# Patient Record
Sex: FEMALE | Race: WHITE | HISPANIC OR LATINO | Employment: FULL TIME | URBAN - METROPOLITAN AREA
[De-identification: names, ages, dates, MRNs, and addresses within clinical notes are randomized per-mention and may not be internally consistent; named-entity substitution may affect disease eponyms.]

---

## 2019-12-20 ENCOUNTER — TELEPHONE (OUTPATIENT)
Dept: URGENT CARE | Facility: CLINIC | Age: 49
End: 2019-12-20

## 2019-12-20 ENCOUNTER — OFFICE VISIT (OUTPATIENT)
Dept: URGENT CARE | Facility: CLINIC | Age: 49
End: 2019-12-20
Payer: COMMERCIAL

## 2019-12-20 VITALS
TEMPERATURE: 99 F | DIASTOLIC BLOOD PRESSURE: 69 MMHG | SYSTOLIC BLOOD PRESSURE: 105 MMHG | RESPIRATION RATE: 18 BRPM | OXYGEN SATURATION: 98 % | HEART RATE: 98 BPM | WEIGHT: 120 LBS | HEIGHT: 61 IN | BODY MASS INDEX: 22.66 KG/M2

## 2019-12-20 DIAGNOSIS — T78.3XXA: Primary | ICD-10-CM

## 2019-12-20 PROCEDURE — 96372 PR INJECTION,THERAP/PROPH/DIAG2ST, IM OR SUBCUT: ICD-10-PCS | Mod: S$GLB,,, | Performed by: NURSE PRACTITIONER

## 2019-12-20 PROCEDURE — 96372 THER/PROPH/DIAG INJ SC/IM: CPT | Mod: S$GLB,,, | Performed by: NURSE PRACTITIONER

## 2019-12-20 PROCEDURE — 99203 OFFICE O/P NEW LOW 30 MIN: CPT | Mod: 25,S$GLB,, | Performed by: NURSE PRACTITIONER

## 2019-12-20 PROCEDURE — 99203 PR OFFICE/OUTPT VISIT, NEW, LEVL III, 30-44 MIN: ICD-10-PCS | Mod: 25,S$GLB,, | Performed by: NURSE PRACTITIONER

## 2019-12-20 RX ORDER — SODIUM CHLORIDE 9 MG/ML
INJECTION, SOLUTION INTRAVENOUS
Status: COMPLETED | OUTPATIENT
Start: 2019-12-20 | End: 2019-12-20

## 2019-12-20 RX ORDER — EPINEPHRINE 0.3 MG/.3ML
1 INJECTION SUBCUTANEOUS
Qty: 2 EACH | Refills: 0 | Status: SHIPPED | OUTPATIENT
Start: 2019-12-20

## 2019-12-20 RX ORDER — PREDNISONE 20 MG/1
20 TABLET ORAL 2 TIMES DAILY
Qty: 10 TABLET | Refills: 0 | Status: SHIPPED | OUTPATIENT
Start: 2019-12-20 | End: 2019-12-25

## 2019-12-20 RX ORDER — DIPHENHYDRAMINE HYDROCHLORIDE 50 MG/ML
25 INJECTION INTRAMUSCULAR; INTRAVENOUS
Status: COMPLETED | OUTPATIENT
Start: 2019-12-20 | End: 2019-12-20

## 2019-12-20 RX ADMIN — DIPHENHYDRAMINE HYDROCHLORIDE 25 MG: 50 INJECTION INTRAMUSCULAR; INTRAVENOUS at 10:12

## 2019-12-20 RX ADMIN — SODIUM CHLORIDE 500 ML: 9 INJECTION, SOLUTION INTRAVENOUS at 11:12

## 2019-12-20 NOTE — PROGRESS NOTES
"Subjective:       Patient ID: Rebecca Edwards is a 49 y.o. female.    Vitals:  height is 5' 1" (1.549 m) and weight is 54.4 kg (120 lb). Her temperature is 98.5 °F (36.9 °C). Her blood pressure is 105/69 and her pulse is 98. Her respiration is 18 and oxygen saturation is 98%.     Chief Complaint: Rash    49-year-old female presents with hives and itchy throat.  Patient states that this started at  approximately 2 am.  She states that she that she ate some crawfish on yesterday.  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  Patient states that she believes that the seafood was boiled together.  States that she took Benadryl this morning with mild relief.  She denies sensation of throat closing, shortness of breath, chest pain, palpitations.  There is no swelling to the oral airway.  Uvula is midline.  She is able to speak and answer questions appropriately without difficulty.  There is no drooling or difficulty swallowing.    Rash   This is a new problem. The current episode started today. The problem has been gradually worsening since onset. The rash is diffuse. The rash is characterized by redness, swelling and itchiness. She was exposed to shellfish. Pertinent negatives include no cough, fever, shortness of breath or sore throat. Past treatments include nothing.       Constitution: Negative for chills and fever.   HENT: Negative for drooling, facial swelling, sore throat and trouble swallowing.    Neck: Negative for painful lymph nodes.   Eyes: Negative for eye itching and eyelid swelling.   Respiratory: Negative for cough and shortness of breath.    Musculoskeletal: Negative for joint pain and joint swelling.   Skin: Positive for rash. Negative for color change, pale, wound, abrasion, laceration, lesion, skin thickening/induration, puncture wound, bruising, abscess, avulsion and hives.   Allergic/Immunologic: Positive for food allergies. Negative for environmental allergies, " immunocompromised state and hives.   Hematologic/Lymphatic: Negative for swollen lymph nodes.       Objective:      Physical Exam   Constitutional: She is oriented to person, place, and time. Vital signs are normal. She appears well-developed and well-nourished. She is cooperative.  Non-toxic appearance. She does not have a sickly appearance. She does not appear ill. No distress.   HENT:   Head: Normocephalic.   Right Ear: Hearing, tympanic membrane, external ear and ear canal normal.   Left Ear: Hearing, tympanic membrane, external ear and ear canal normal.   Nose: Nose normal.   Mouth/Throat: Uvula is midline, oropharynx is clear and moist and mucous membranes are normal. Mucous membranes are not pale, not dry and not cyanotic. No uvula swelling. No oropharyngeal exudate, posterior oropharyngeal edema, posterior oropharyngeal erythema, tonsillar abscesses or cobblestoning. No tonsillar exudate.   No obvious swelling of tongue or oral airway   Eyes: Conjunctivae are normal.   Sclera clear bilat   Neck: Normal range of motion.   Cardiovascular: Normal rate, regular rhythm and normal heart sounds.   Pulmonary/Chest: Effort normal and breath sounds normal. No accessory muscle usage or stridor. No apnea, no tachypnea and no bradypnea. No respiratory distress. She has no decreased breath sounds. She has no wheezes. She has no rhonchi. She has no rales.   Abdominal: Soft. Normal appearance.   Neurological: She is alert and oriented to person, place, and time.   Skin: Skin is warm, dry, rash and urticarial. Capillary refill takes less than 2 seconds.   Nursing note and vitals reviewed.        Assessment:       1. Allergic angioedema due to seafood, initial encounter        Plan:       Patient is greatly improved after receiving 50 mg of IV Benadryl 125 mg of Solu-Medrol and 1 L of IV fluids.    Hives have decreased patient states that she is feeling better.  She denies any numbness or tingling of the tongue.  I will put a  referral in for an allergist.  Patient does not have PCP.  Also will give patient a script for EpiPen and given instructions on how to use.  Strict ER precautions given.  Discussed with the patient how to use EpiPen and to go to ER to use.  Patient states she understands patient's  is educated also on using EpiPen.  Allergic angioedema due to seafood, initial encounter  -     diphenhydrAMINE injection 25 mg  -     methylPREDNISolone sod suc(PF) injection 125 mg  -     diphenhydrAMINE injection 25 mg  -     0.9%  NaCl infusion  -     IV catheter kit accessory 18 gauge x 4 Fr ISet; 1 application by Misc.(Non-Drug; Combo Route) route once. for 1 dose  -     predniSONE (DELTASONE) 20 MG tablet; Take 1 tablet (20 mg total) by mouth 2 (two) times daily. for 5 days  Dispense: 10 tablet; Refill: 0  -     Ambulatory referral to Allergy  -     EPINEPHrine (EPIPEN) 0.3 mg/0.3 mL AtIn; Inject 0.3 mLs (0.3 mg total) into the muscle as needed (anaphylaxis).  Dispense: 2 each; Refill: 0      Patient Instructions     Please drink plenty of fluids.  Please get plenty of rest.  Please return here or go to the Emergency Department for any concerns or worsening of condition.  Please take over the counter Pepcid or Zantac as directed for the next 24-72hours as needed.  If you were given a steroid shot in the clinic and have also been given a prescription for a steroid such as Prednisone or a Medrol Dose Pack, please begin taking them tomorrow.  If you have a localized reaction it is ok to apply Cortaid as directed to the affected area.    Please take over the counter Benadryl as directed for the next 24-72 hours as needed for itching unless it makes you sleepy, then you can take over the counter Allegra or Claritin or Zyrtec as directed during the daytime hours as these generally do not cause drowsiness.    Please follow up with your primary care doctor or specialist as needed.    In the future make sure to keep benadryl with you  or an Epi-pen if you were prescribed one.  Discharge Instructions: Using an EpiPen Auto-Injector  Your healthcare provider has prescribed the EpiPen Auto-Injector for you. The EpiPen is used to give yourself a shot during an emergency allergic reaction. The pen is disposable and has a hidden needle. The needle is activated by a spring inside the pen. EpiPen makes giving yourself a shot easy. It also makes it easy for someone else to give you a shot if you are unable to do it for yourself.     Activate the EpiPen by removing the safety cap.       Jab the injector against the outside of your thigh.      Be prepared  Be prepared to use your EpiPen before you have an allergic reaction:  · Keep more than one EpiPen. Carry one kit with you and keep others in easy-to-find places, at home and at work.  · Make sure you check the expiration dates of your EpiPens.  · Dispose of the EpiPen properly after each use. The instructions that come with the EpiPen tell you how to do so.  Prepare your family and friends  Wear a medical ID bracelet. It should let others know about your allergy and what to do in an emergency. Tell your family, friends, and coworkers what they should do if you have a severe allergic reaction:  · Tell them to call 911 if it seems you are having a reaction.  · Tell them to start CPR if you stop breathing.  · Ask them to make sure you are lying down with your legs raised during the reaction.  · Show them how to use the EpiPen.  · If they need to give you a shot, tell them to always use the side of your thigh.  What to do if you have an allergic reaction  If you have an allergic reaction, give yourself a shot using the EpiPen. This will counteract the reaction until medical help arrives.  · Use any site on the side of your thigh. There is no need to look for the best injection site or to give the shot in the buttocks or arm.  · With the tip of the EpiPen pointed toward the side of your thigh, jab the pen  against your thigh for 10 seconds. This releases a spring-activated plunger, which pushes the hidden needle into the thigh muscle and gives a dose of adrenaline (epinephrine).  · Call 911 right after giving the injection.  · Lie down and elevate your legs while you wait for help to arrive.  Preventing allergic reactions  · Be careful. Try to avoid the items that cause your allergic reaction.  · Tell all your healthcare providers, including your pharmacist, about any allergies you have to medications. Keep a list of alternative medicines handy.  · Ask your healthcare provider if allergy shots (immunotherapy) will help you.  Follow-up care  Follow up with your healthcare provider, or as advised.  When to seek medical care   Call 911 right away if you have any of the following signs of severe allergic reaction:  · Racing pulse  · Wheezing or trouble breathing  · Swollen lips, tongue, or throat  · Drowsiness, fainting, or loss of consciousness  · Upset stomach (nausea) and vomiting  · Itchy, blotchy skin, rash, or hives  · Pale, cool, damp skin  · Confusion   Date Last Reviewed: 7/1/2016  © 8626-3482 [employer]. [address] [place] [address]. All rights reserved. This information is not intended as a substitute for professional medical care. Always follow your healthcare professional's instructions.        Anaphylaxis  Anaphylaxis is a severe allergic reaction that can be life threatening. This reaction can happen in a few minutes, or a few hours after exposure to what you are allergic to. Some people are more prone to this than others.  The symptoms of an anaphylactic reaction may at first seem similar to other allergic reactions. If this has happened to you in the past, do not let the initial mild symptoms, such as a rash, hives and itching, mislead you. Your reaction can worsen very quickly and become much more severe and life threatening within minutes.  More severe symptoms  include:  · Trouble swallowing, feeling like your throat is closing  · Trouble breathing, wheezing  · Cool, moist or pale (blue in color) skin  · Hoarse voice or trouble speaking  · Nausea, vomiting, diarrhea, stomach cramps, or pain  · Feeling faint or lightheaded, rapid heart rate, low blood pressure  · Feeling dizzy or confused  · Becoming very drowsy, poorly responsive, or trouble awakening  · Seizure  Sometimes the cause may be obvious, like knowing you are allergic to peanuts. To help identify your allergen, remember:  · When it started  · What you were doing at the time or just before that  · Any activities you were involved in  · Any new products or contacts   Here are some common causes, but remember almost anything can cause a reaction, and you may not even be aware that you came into contact with one of these things.  · Dust, mold, pollen  · Plants, such as poison ivy and poison oak  · Animals  · Foods such as shrimp, shellfish, peanuts, milk products, gluten, eggs; also colorings, flavorings, additives  · Insect bites or stings such as bees, mosquitos, fleas, ticks  · Medicines such as penicillin, sulfa drugs, amoxicillin, aspirin, ibuprofen; any medicine can cause a reaction  · Jewelry such as nickel, or gold (new, or something youve worn for a while including zippers, and buttons)  · Latex such as in gloves, clothes, toys, balloons, or some tapes (some people allergic to latex may also  have problems with foods like bananas, avocados, kiwi, papaya, or chestnuts)  · Lotions, perfumes, cosmetics, soaps, shampoos, skincare products, nail products  · Chemicals or dyes in clothing, linen, , hair dyes, soaps, iodine  If you are exposed to the same substance again, you may have the same or more severe reaction. Treatment for anaphylaxis is epinephrine (adrenalin). This is available by prescription as a self-injectable pen. If the cause of your reaction is known, you should avoid exposure in the  future. If the cause is not known, follow up with your doctor for special testing to determine what you are allergic to.     Home care  If it was safe for you to go home, watch for any worsening of symptoms. You may need to be treated again.  Medicines  Injectable epinephrine  One of the key tools in treating anaphylaxis is early use of epinephrine. If you had a severe allergic or anaphylactic reaction, the doctor may prescribe a self- injectable epinephrine kit. If this was prescribed, carry it at all times. It can be life saving. Epinephrine can help stop the progression of an allergic reaction. Its effects are brief, so after you use the medicine, it is still very important to call 911 and get to an emergency room.  When to use injectable epinephrine. Use the epinephrine if you have a history of severe reactions or any of the following symptoms:  · Swelling in your mouth or throat   · Trouble speaking or swallowing  · Trouble breathing  · Feeling faint, low blood pressure, or becoming drowsy or poorly responsive  · Worsening rash  How to use injectable epinephrine:  · Hold the syringe firmly in your hand with the orange (or black) needle end away from your thumb  · Be careful not to stick your fingers or hand with the needle.  · At the opposite end, pull off the activation cap- the blue or grey tab  · Holding the syringe tightly, jab it into the outer part of your upper thigh. This is one of the softest, fleshiest parts of the upper leg, and is not near a major blood vessel or nerve. Be careful not to inject it into your hip or any place that there is a pulse.  · You can inject it through pants, but make sure not to inject it into the seam of the pants.  · Do not pull it out right away. Try to hold the needle in place for 10 seconds.  · Massage the spot for a few seconds or as directed by your healthcare provider.  · If you are injecting it in someone else or a child, try to hold them or their leg still. If they  jerk or yank their legs away as you are doing it, it can cause a cut on their leg.  You may feel shaky, jittery, nervous, and anxious after the injection. Although it is difficult, try to relax. This is a side effect of the epinephrine, and should stop after a few minutes   Important  · Get to the emergency room after using the epinephrine. Its affect will wear off, and you may have a second reaction. This could even happen hours later.  · Never intentionally eat, use, or expose yourself to the substance that caused the anaphylactic reaction.  Nothing is foolproof, including the injectable epinephrine.  Other medicines  The doctor may prescribe medicine to relieve swelling, itching, and pain. Follow the doctors instructions when using this medicine.   · Oral diphenhydramine is an antihistamine available at drug and grocery stores. Unless a prescription antihistamine was given, diphenhydramine may be used to reduce itching if large areas of the skin are involved. It may make you sleepy, so be careful using it in the daytime or when going to school, working, or driving.   · Do not use diphenhydramine cream on your skin, because in some people it can cause a further reaction.  · You may use over-the-counter acetaminophen or ibuprofen to control pain, unless another pain medicine was prescribed.  · If you were prescribed any medicines to prevent symptoms from returning, be sure to take them exactly as directed.  General care  · Rest at home for the next 24 hours.  · Avoid tobacco and alcohol consumption. These may worsen your symptoms.  · If you know what caused your reaction today, avoid that in the future since the next reaction may be worse. Let your family members, friends and personal physician know about your allergic reaction.  · If your allergy was to food, learn how to read food labels so you can check for that ingredient. If a product does not have a label, it is best to avoid it.  · Consider carrying an  identification card or getting a medical alert bracelet to inform medical personnel of your condition in case you cannot tell them.  · Tell all of your healthcare providers that you had an anaphylactic reaction. Make certain the information is added to your electronic or paper medical records.  Follow-up care  Follow up with your doctor or as advised if you are not improving over the next 1 to 2 days.  Call 911  Call 911 if any of these occur:  · Trouble breathing or swallowing, wheezing  · Hoarse voice or trouble speaking  · Chest pain  · Confused  · Very drowsy or trouble awakening  · Fainting or loss of consciousness  · Rapid heart rate  · Vomiting blood, or large amounts of blood in stool  · Seizure  · Swelling in the eyes, mouth, face, or tongue  · Dizziness or weakness  · Cool, moist, or pale (blue in color) skin  · Nausea, vomiting, diarrhea, stomach cramps, or abdominal pain  When to seek medical advice  Call your healthcare provider right away if any of these occur:  · Worsening of your symptoms  · New symptoms develop  · Symptoms don't go away or come back  Date Last Reviewed: 4/1/2017  © 1554-0182 [employer]. [address] [place] [address]. All rights reserved. This information is not intended as a substitute for professional medical care. Always follow your healthcare professional's instructions.        Food Allergy  The best way to deal with food allergies is to avoid the foods you are allergic to. Understand and be aware of the foods that you have reacted to. Also be cautious of foods or dishes that may have flavorings or small amounts of foods that you are allergic to.  Symptoms of food allergy may begin within minutes, but can start 2 hours after eating or later. Common symptoms can include:  · Nausea  · Vomiting  · Diarrhea or stomach cramps  · Iitchy rash (hives)  · Swelling of the eyes, lips, face or tongue  · Wheezing  · Difficulty breathing or swallowing  · Throat  "tightness  · Dizziness or fainting  This kind of allergic reaction, called anaphylaxis, can be life-threatening. In mild and moderate cases the symptoms usually begin improving within 6 to 24 hours. People with certain health problems, such as asthma and eczema, may be more likely to have food allergies. Foods that people are most commonly allergic to are milk or dairy products, eggs, peanuts, tree nuts, soy, shellfish, and wheat. Remember that any food can cause a reaction. Treatment for a severe allergic reaction can include epinephrine. If you have a severe food allergy, or have had severe allergic reactions even if you don't know the cause, you should carry this medicine with you for self-injection. It is available by prescription. It is also available in a lower dose form for children from your healthcare provider.  Home care  The following guidelines will help you care for yourself at home:  · If your symptoms were moderate to severe, they may fluctuate for the next 24 hours. It may be best to rest at home during that time.  · Avoid tobacco and alcohol because they can make symptoms worse. They can also interact with the medicines you are taking to treat the allergic reaction.  · If you know what foods caused your reaction today, avoid them in the future. The next and each reaction after this may make your body more sensitive to these foods. This can cause a worse reaction later. Tell your family members, friends, and doctors about your food allergy, especially in an emergency situation since they need to know how to give you epinephrine if you are unable to. This can be life-saving.  · Learn how to read food labels so you can check for the substance that you reacted to. If a food does not have a label, it is best to avoid it. When in restaurants, ask about ingredients and tell the staff, "If I eat a dish containing (food you are allergic to), I could have a severe allergic reaction."  · If your reaction was " severe, get a medical alert bracelet or necklace that notes your allergy.  · If epinephrine is prescribed, carry it with you at all times. Learn how to use the device. If you begin to feel the symptoms of another reaction, use the epinephrine to inject yourself right away, and call 911. Dont wait until symptoms become severe.  · Oral allergy medicines (diphenhydramine) are antihistamines that can help with the reaction. You can buy them at any pharmacy or supermarket. They come in liquids, pills, or capsules. Unless your doctor gave you a prescription antihistamine, you can use these medicines to ease itching. Allergy medicines can make you sleepy, so be careful, especially when driving or working. For this reason, you may want to use lower doses during the day and save the higher doses for bedtime. Don't use diphenhydramine if you have glaucoma or if you are a man with trouble urinating because of an enlarged prostate.  · If allergy medicines with diphenhydramine make you too sleepy, talk with your healthcare provider. He or she can recommend an over-the counter antihistamine that won't make you sleepy. These may not work as well, though.  Follow-up care  Follow up with your healthcare provider if your symptoms don't get better over the next 2 to 3 days. If you don't know what caused this reaction, your provider may order skin tests and blood tests, or an elimination diet. You can find an allergy specialist in your area by contacting:  · American Academy of Allergy, Asthma & Immunology, www.aaaai.org  · American College of Allergy, Asthma & Immunology, www.acaai.org  When to seek medical advice  [facility] right away if any of these occur:  · Your symptoms get worse  · New or worse swelling in the face, eyelids, lips, mouth, throat, or tongue  · Mild trouble swallowing, breathing, or wheezing  · Fever of 100.4°F (38.0°C) or higher, or as directed by your health care provider  · Severe  abdominal pain  · Persistent vomiting (unable to keep liquids down) or constant diarrhea  · Blood or mucus in the stool  Call 911  If any of these occur, give yourself injectable epinephrine and call 911:  · Significant trouble breathing, talking, or swallowing  · Any change in level of alertness or unconsciousness, including dizziness, weakness, or fainting  · Cool, moist skin  · Fast, weak hearbeat  · Severe wheezing  · Hives  · Severe swelling of the face, tongue, or lips  · Drooling  · Vomiting that happens soon after eating a food you think you are allergic to  · Explosive diarrhea  Date Last Reviewed: 1/11/2016 © 2000-2017 [employer]. [address] [place] [address], [place] [address]. All rights reserved. This information is not intended as a substitute for professional medical care. Always follow your healthcare professional's instructions.        Understanding Hives (Urticaria)  Urticaria (hives) are red, itchy, and swollen areas on the skin. They are most often an allergic reaction from eating a food or taking a medicine. Sometimes the cause may be unknown. A single hive can vary in size from a half inch to several inches. Hives can appear all over the body. Or they may appear on only one part of the body.  What causes hives?  Hives can be caused by food and beverages such as:  · Tree nuts (almonds, walnuts, hazelnuts)  · Peanuts  · Eggs  · Shellfish  · Milk  Hives can also be caused by medicines such as:  · Antibiotics, especially penicillin and sulfa-based medicines   · Anticonvulsant or antiseizure medicines   · Chemotherapy medicines   Other causes of hives include:  · Infection or virus  · Heat  · Cold air or cold water  · Exercise  · Scratching or rubbing your skin, or wearing tight-fitting clothes that rub your skin  · Being exposed to sunlight or light from a light bulb, in rare cases  · Inhaled-chemicals in the environment from foods and drugs, insects, plants, or other sources  In some  cases, hives may occur again and again with no specific cause.  If you have hives  · Avoid the food, drink, medicine, or other factor that may be causing the hives.  · Make a thick paste of baking soda and water. Apply the paste directly to your skin. This can help lessen itching.  · Talk with your healthcare provider right away if you think a medicine gave you hives.  Watch for anaphylaxis  If you have hives, watch for symptoms of a severe reaction that can affect your entire body. This is called anaphylaxis. Symptoms can include swollen areas of the body, wheezing, trouble breathing or swallowing, and a hoarse voice. This reaction may happen right away. Or it may happen in an hour or more. In extreme cases, the airways from mouth to lungs may swell and make breathing difficult. This is a medical emergency. Use epinephrine medicine if you have it, and call 911 or go to the emergency room.     When to call your healthcare provider  Call your healthcare provider if:  · Your hives feel uncomfortable  · You have never had hives before  · Your symptoms don't go away or come back  · Your symptoms get worse or new symptoms develop such as:   ¨ Sneezing, coughing, runny or stuffy nose  ¨ Itching of the eyes, nose, or roof of the mouth  ¨ Itching, burning, stinging, or pain  ¨ Dry, flaky, cracking, or scaly skin  ¨ Red or purple spots  When to call 911  Call 911 right away if you have:  · Swelling in your lips, tongue, or throat, called angioedema  · Drooling  · Trouble breathing, talking, or swallowing  · Cool, moist or pale (blue in color) skin  · Fast and weak heartbeat  · Wheezing or short of breath  · Feeling lightheaded or confused  · Diarrhea  · Severe nausea or vomiting  · Seizure  · Feeling dizzy or weak, or a sudden drop in blood pressure   Date Last Reviewed: 4/1/2017 © 2000-2017 [employer] [address] [place] [address], [place] [address]. All rights reserved. This information is not intended as a  substitute for professional medical care. Always follow your healthcare professional's instructions.             Patient Instructions     Please drink plenty of fluids.  Please get plenty of rest.  Please return here or go to the Emergency Department for any concerns or worsening of condition.  Please take over the counter Pepcid or Zantac as directed for the next 24-72hours as needed.  If you were given a steroid shot in the clinic and have also been given a prescription for a steroid such as Prednisone or a Medrol Dose Pack, please begin taking them tomorrow.  If you have a localized reaction it is ok to apply Cortaid as directed to the affected area.    Please take over the counter Benadryl as directed for the next 24-72 hours as needed for itching unless it makes you sleepy, then you can take over the counter Allegra or Claritin or Zyrtec as directed during the daytime hours as these generally do not cause drowsiness.    Please follow up with your primary care doctor or specialist as needed.    In the future make sure to keep benadryl with you or an Epi-pen if you were prescribed one.  Discharge Instructions: Using an EpiPen Auto-Injector  Your healthcare provider has prescribed the EpiPen Auto-Injector for you. The EpiPen is used to give yourself a shot during an emergency allergic reaction. The pen is disposable and has a hidden needle. The needle is activated by a spring inside the pen. EpiPen makes giving yourself a shot easy. It also makes it easy for someone else to give you a shot if you are unable to do it for yourself.     Activate the EpiPen by removing the safety cap.       Jab the injector against the outside of your thigh.      Be prepared  Be prepared to use your EpiPen before you have an allergic reaction:  · Keep more than one EpiPen. Carry one kit with you and keep others in easy-to-find places, at home and at work.  · Make sure you check the expiration dates of your EpiPens.  · Dispose of the  EpiPen properly after each use. The instructions that come with the EpiPen tell you how to do so.  Prepare your family and friends  Wear a medical ID bracelet. It should let others know about your allergy and what to do in an emergency. Tell your family, friends, and coworkers what they should do if you have a severe allergic reaction:  · Tell them to call 911 if it seems you are having a reaction.  · Tell them to start CPR if you stop breathing.  · Ask them to make sure you are lying down with your legs raised during the reaction.  · Show them how to use the EpiPen.  · If they need to give you a shot, tell them to always use the side of your thigh.  What to do if you have an allergic reaction  If you have an allergic reaction, give yourself a shot using the EpiPen. This will counteract the reaction until medical help arrives.  · Use any site on the side of your thigh. There is no need to look for the best injection site or to give the shot in the buttocks or arm.  · With the tip of the EpiPen pointed toward the side of your thigh, jab the pen against your thigh for 10 seconds. This releases a spring-activated plunger, which pushes the hidden needle into the thigh muscle and gives a dose of adrenaline (epinephrine).  · Call 911 right after giving the injection.  · Lie down and elevate your legs while you wait for help to arrive.  Preventing allergic reactions  · Be careful. Try to avoid the items that cause your allergic reaction.  · Tell all your healthcare providers, including your pharmacist, about any allergies you have to medications. Keep a list of alternative medicines handy.  · Ask your healthcare provider if allergy shots (immunotherapy) will help you.  Follow-up care  Follow up with your healthcare provider, or as advised.  When to seek medical care   Call 911 right away if you have any of the following signs of severe allergic reaction:  · Racing pulse  · Wheezing or trouble breathing  · Swollen lips,  tongue, or throat  · Drowsiness, fainting, or loss of consciousness  · Upset stomach (nausea) and vomiting  · Itchy, blotchy skin, rash, or hives  · Pale, cool, damp skin  · Confusion   Date Last Reviewed: 7/1/2016 © 2000-2017 [employer]. [address] [place] [id number]. All rights reserved. This information is not intended as a substitute for professional medical care. Always follow your healthcare professional's instructions.        Anaphylaxis  Anaphylaxis is a severe allergic reaction that can be life threatening. This reaction can happen in a few minutes, or a few hours after exposure to what you are allergic to. Some people are more prone to this than others.  The symptoms of an anaphylactic reaction may at first seem similar to other allergic reactions. If this has happened to you in the past, do not let the initial mild symptoms, such as a rash, hives and itching, mislead you. Your reaction can worsen very quickly and become much more severe and life threatening within minutes.  More severe symptoms include:  · Trouble swallowing, feeling like your throat is closing  · Trouble breathing, wheezing  · Cool, moist or pale (blue in color) skin  · Hoarse voice or trouble speaking  · Nausea, vomiting, diarrhea, stomach cramps, or pain  · Feeling faint or lightheaded, rapid heart rate, low blood pressure  · Feeling dizzy or confused  · Becoming very drowsy, poorly responsive, or trouble awakening  · Seizure  Sometimes the cause may be obvious, like knowing you are allergic to peanuts. To help identify your allergen, remember:  · When it started  · What you were doing at the time or just before that  · Any activities you were involved in  · Any new products or contacts   Here are some common causes, but remember almost anything can cause a reaction, and you may not even be aware that you came into contact with one of these things.  · Dust, mold, pollen  · Plants, such as poison ivy and  poison oak  · Animals  · Foods such as shrimp, shellfish, peanuts, milk products, gluten, eggs; also colorings, flavorings, additives  · Insect bites or stings such as bees, mosquitos, fleas, ticks  · Medicines such as penicillin, sulfa drugs, amoxicillin, aspirin, ibuprofen; any medicine can cause a reaction  · Jewelry such as nickel, or gold (new, or something youve worn for a while including zippers, and buttons)  · Latex such as in gloves, clothes, toys, balloons, or some tapes (some people allergic to latex may also  have problems with foods like bananas, avocados, kiwi, papaya, or chestnuts)  · Lotions, perfumes, cosmetics, soaps, shampoos, skincare products, nail products  · Chemicals or dyes in clothing, linen, , hair dyes, soaps, iodine  If you are exposed to the same substance again, you may have the same or more severe reaction. Treatment for anaphylaxis is epinephrine (adrenalin). This is available by prescription as a self-injectable pen. If the cause of your reaction is known, you should avoid exposure in the future. If the cause is not known, follow up with your doctor for special testing to determine what you are allergic to.     Home care  If it was safe for you to go home, watch for any worsening of symptoms. You may need to be treated again.  Medicines  Injectable epinephrine  One of the key tools in treating anaphylaxis is early use of epinephrine. If you had a severe allergic or anaphylactic reaction, the doctor may prescribe a self- injectable epinephrine kit. If this was prescribed, carry it at all times. It can be life saving. Epinephrine can help stop the progression of an allergic reaction. Its effects are brief, so after you use the medicine, it is still very important to call 911 and get to an emergency room.  When to use injectable epinephrine. Use the epinephrine if you have a history of severe reactions or any of the following symptoms:  · Swelling in your mouth or  throat   · Trouble speaking or swallowing  · Trouble breathing  · Feeling faint, low blood pressure, or becoming drowsy or poorly responsive  · Worsening rash  How to use injectable epinephrine:  · Hold the syringe firmly in your hand with the orange (or black) needle end away from your thumb  · Be careful not to stick your fingers or hand with the needle.  · At the opposite end, pull off the activation cap- the blue or grey tab  · Holding the syringe tightly, jab it into the outer part of your upper thigh. This is one of the softest, fleshiest parts of the upper leg, and is not near a major blood vessel or nerve. Be careful not to inject it into your hip or any place that there is a pulse.  · You can inject it through pants, but make sure not to inject it into the seam of the pants.  · Do not pull it out right away. Try to hold the needle in place for 10 seconds.  · Massage the spot for a few seconds or as directed by your healthcare provider.  · If you are injecting it in someone else or a child, try to hold them or their leg still. If they jerk or yank their legs away as you are doing it, it can cause a cut on their leg.  You may feel shaky, jittery, nervous, and anxious after the injection. Although it is difficult, try to relax. This is a side effect of the epinephrine, and should stop after a few minutes   Important  · Get to the emergency room after using the epinephrine. Its affect will wear off, and you may have a second reaction. This could even happen hours later.  · Never intentionally eat, use, or expose yourself to the substance that caused the anaphylactic reaction.  Nothing is foolproof, including the injectable epinephrine.  Other medicines  The doctor may prescribe medicine to relieve swelling, itching, and pain. Follow the doctors instructions when using this medicine.   · Oral diphenhydramine is an antihistamine available at drug and grocery stores. Unless a prescription antihistamine was given,  diphenhydramine may be used to reduce itching if large areas of the skin are involved. It may make you sleepy, so be careful using it in the daytime or when going to school, working, or driving.   · Do not use diphenhydramine cream on your skin, because in some people it can cause a further reaction.  · You may use over-the-counter acetaminophen or ibuprofen to control pain, unless another pain medicine was prescribed.  · If you were prescribed any medicines to prevent symptoms from returning, be sure to take them exactly as directed.  General care  · Rest at home for the next 24 hours.  · Avoid tobacco and alcohol consumption. These may worsen your symptoms.  · If you know what caused your reaction today, avoid that in the future since the next reaction may be worse. Let your family members, friends and personal physician know about your allergic reaction.  · If your allergy was to food, learn how to read food labels so you can check for that ingredient. If a product does not have a label, it is best to avoid it.  · Consider carrying an identification card or getting a medical alert bracelet to inform medical personnel of your condition in case you cannot tell them.  · Tell all of your healthcare providers that you had an anaphylactic reaction. Make certain the information is added to your electronic or paper medical records.  Follow-up care  Follow up with your doctor or as advised if you are not improving over the next 1 to 2 days.  Call 911  Call 911 if any of these occur:  · Trouble breathing or swallowing, wheezing  · Hoarse voice or trouble speaking  · Chest pain  · Confused  · Very drowsy or trouble awakening  · Fainting or loss of consciousness  · Rapid heart rate  · Vomiting blood, or large amounts of blood in stool  · Seizure  · Swelling in the eyes, mouth, face, or tongue  · Dizziness or weakness  · Cool, moist, or pale (blue in color) skin  · Nausea, vomiting, diarrhea, stomach cramps, or abdominal  pain  When to seek medical advice  Call your healthcare provider right away if any of these occur:  · Worsening of your symptoms  · New symptoms develop  · Symptoms don't go away or come back  Date Last Reviewed: 4/1/2017 © 2000-2017 [employer]. [address] [place] [address], [place] [address]. All rights reserved. This information is not intended as a substitute for professional medical care. Always follow your healthcare professional's instructions.        Food Allergy  The best way to deal with food allergies is to avoid the foods you are allergic to. Understand and be aware of the foods that you have reacted to. Also be cautious of foods or dishes that may have flavorings or small amounts of foods that you are allergic to.  Symptoms of food allergy may begin within minutes, but can start 2 hours after eating or later. Common symptoms can include:  · Nausea  · Vomiting  · Diarrhea or stomach cramps  · Iitchy rash (hives)  · Swelling of the eyes, lips, face or tongue  · Wheezing  · Difficulty breathing or swallowing  · Throat tightness  · Dizziness or fainting  This kind of allergic reaction, called anaphylaxis, can be life-threatening. In mild and moderate cases the symptoms usually begin improving within 6 to 24 hours. People with certain health problems, such as asthma and eczema, may be more likely to have food allergies. Foods that people are most commonly allergic to are milk or dairy products, eggs, peanuts, tree nuts, soy, shellfish, and wheat. Remember that any food can cause a reaction. Treatment for a severe allergic reaction can include epinephrine. If you have a severe food allergy, or have had severe allergic reactions even if you don't know the cause, you should carry this medicine with you for self-injection. It is available by prescription. It is also available in a lower dose form for children from your healthcare provider.  Home care  The following guidelines will help you care for  "yourself at home:  · If your symptoms were moderate to severe, they may fluctuate for the next 24 hours. It may be best to rest at home during that time.  · Avoid tobacco and alcohol because they can make symptoms worse. They can also interact with the medicines you are taking to treat the allergic reaction.  · If you know what foods caused your reaction today, avoid them in the future. The next and each reaction after this may make your body more sensitive to these foods. This can cause a worse reaction later. Tell your family members, friends, and doctors about your food allergy, especially in an emergency situation since they need to know how to give you epinephrine if you are unable to. This can be life-saving.  · Learn how to read food labels so you can check for the substance that you reacted to. If a food does not have a label, it is best to avoid it. When in restaurants, ask about ingredients and tell the staff, "If I eat a dish containing (food you are allergic to), I could have a severe allergic reaction."  · If your reaction was severe, get a medical alert bracelet or necklace that notes your allergy.  · If epinephrine is prescribed, carry it with you at all times. Learn how to use the device. If you begin to feel the symptoms of another reaction, use the epinephrine to inject yourself right away, and call 911. Dont wait until symptoms become severe.  · Oral allergy medicines (diphenhydramine) are antihistamines that can help with the reaction. You can buy them at any pharmacy or supermarket. They come in liquids, pills, or capsules. Unless your doctor gave you a prescription antihistamine, you can use these medicines to ease itching. Allergy medicines can make you sleepy, so be careful, especially when driving or working. For this reason, you may want to use lower doses during the day and save the higher doses for bedtime. Don't use diphenhydramine if you have glaucoma or if you are a man with trouble " urinating because of an enlarged prostate.  · If allergy medicines with diphenhydramine make you too sleepy, talk with your healthcare provider. He or she can recommend an over-the counter antihistamine that won't make you sleepy. These may not work as well, though.  Follow-up care  Follow up with your healthcare provider if your symptoms don't get better over the next 2 to 3 days. If you don't know what caused this reaction, your provider may order skin tests and blood tests, or an elimination diet. You can find an allergy specialist in your area by contacting:  · American Academy of Allergy, Asthma & Immunology, www.aaaai.org  · American College of Allergy, Asthma & Immunology, www.acaai.org  When to seek medical advice  [facility] right away if any of these occur:  · Your symptoms get worse  · New or worse swelling in the face, eyelids, lips, mouth, throat, or tongue  · Mild trouble swallowing, breathing, or wheezing  · Fever of 100.4°F (38.0°C) or higher, or as directed by your health care provider  · Severe abdominal pain  · Persistent vomiting (unable to keep liquids down) or constant diarrhea  · Blood or mucus in the stool  Call 911  If any of these occur, give yourself injectable epinephrine and call 911:  · Significant trouble breathing, talking, or swallowing  · Any change in level of alertness or unconsciousness, including dizziness, weakness, or fainting  · Cool, moist skin  · Fast, weak hearbeat  · Severe wheezing  · Hives  · Severe swelling of the face, tongue, or lips  · Drooling  · Vomiting that happens soon after eating a food you think you are allergic to  · Explosive diarrhea  Date Last Reviewed: 1/11/2016  © 0266-1839 [employer]. [address] [place] [address], [place] [address]. All rights reserved. This information is not intended as a substitute for professional medical care. Always follow your healthcare professional's instructions.        Understanding Hives  (Urticaria)  Urticaria (hives) are red, itchy, and swollen areas on the skin. They are most often an allergic reaction from eating a food or taking a medicine. Sometimes the cause may be unknown. A single hive can vary in size from a half inch to several inches. Hives can appear all over the body. Or they may appear on only one part of the body.  What causes hives?  Hives can be caused by food and beverages such as:  · Tree nuts (almonds, walnuts, hazelnuts)  · Peanuts  · Eggs  · Shellfish  · Milk  Hives can also be caused by medicines such as:  · Antibiotics, especially penicillin and sulfa-based medicines   · Anticonvulsant or antiseizure medicines   · Chemotherapy medicines   Other causes of hives include:  · Infection or virus  · Heat  · Cold air or cold water  · Exercise  · Scratching or rubbing your skin, or wearing tight-fitting clothes that rub your skin  · Being exposed to sunlight or light from a light bulb, in rare cases  · Inhaled-chemicals in the environment from foods and drugs, insects, plants, or other sources  In some cases, hives may occur again and again with no specific cause.  If you have hives  · Avoid the food, drink, medicine, or other factor that may be causing the hives.  · Make a thick paste of baking soda and water. Apply the paste directly to your skin. This can help lessen itching.  · Talk with your healthcare provider right away if you think a medicine gave you hives.  Watch for anaphylaxis  If you have hives, watch for symptoms of a severe reaction that can affect your entire body. This is called anaphylaxis. Symptoms can include swollen areas of the body, wheezing, trouble breathing or swallowing, and a hoarse voice. This reaction may happen right away. Or it may happen in an hour or more. In extreme cases, the airways from mouth to lungs may swell and make breathing difficult. This is a medical emergency. Use epinephrine medicine if you have it, and call 911 or go to the emergency  room.     When to call your healthcare provider  Call your healthcare provider if:  · Your hives feel uncomfortable  · You have never had hives before  · Your symptoms don't go away or come back  · Your symptoms get worse or new symptoms develop such as:   ¨ Sneezing, coughing, runny or stuffy nose  ¨ Itching of the eyes, nose, or roof of the mouth  ¨ Itching, burning, stinging, or pain  ¨ Dry, flaky, cracking, or scaly skin  ¨ Red or purple spots  When to call 911  Call 911 right away if you have:  · Swelling in your lips, tongue, or throat, called angioedema  · Drooling  · Trouble breathing, talking, or swallowing  · Cool, moist or pale (blue in color) skin  · Fast and weak heartbeat  · Wheezing or short of breath  · Feeling lightheaded or confused  · Diarrhea  · Severe nausea or vomiting  · Seizure  · Feeling dizzy or weak, or a sudden drop in blood pressure   Date Last Reviewed: 4/1/2017 © 2000-2017 [employer]. [address] [place] [address], [place] [address]. All rights reserved. This information is not intended as a substitute for professional medical care. Always follow your healthcare professional's instructions.

## 2019-12-20 NOTE — PATIENT INSTRUCTIONS
Please drink plenty of fluids.  Please get plenty of rest.  Please return here or go to the Emergency Department for any concerns or worsening of condition.  Please take over the counter Pepcid or Zantac as directed for the next 24-72hours as needed.  If you were given a steroid shot in the clinic and have also been given a prescription for a steroid such as Prednisone or a Medrol Dose Pack, please begin taking them tomorrow.  If you have a localized reaction it is ok to apply Cortaid as directed to the affected area.    Please take over the counter Benadryl as directed for the next 24-72 hours as needed for itching unless it makes you sleepy, then you can take over the counter Allegra or Claritin or Zyrtec as directed during the daytime hours as these generally do not cause drowsiness.    Please follow up with your primary care doctor or specialist as needed.    In the future make sure to keep benadryl with you or an Epi-pen if you were prescribed one.  Discharge Instructions: Using an EpiPen Auto-Injector  Your healthcare provider has prescribed the EpiPen Auto-Injector for you. The EpiPen is used to give yourself a shot during an emergency allergic reaction. The pen is disposable and has a hidden needle. The needle is activated by a spring inside the pen. EpiPen makes giving yourself a shot easy. It also makes it easy for someone else to give you a shot if you are unable to do it for yourself.     Activate the EpiPen by removing the safety cap.       Jab the injector against the outside of your thigh.      Be prepared  Be prepared to use your EpiPen before you have an allergic reaction:  · Keep more than one EpiPen. Carry one kit with you and keep others in easy-to-find places, at home and at work.  · Make sure you check the expiration dates of your EpiPens.  · Dispose of the EpiPen properly after each use. The instructions that come with the EpiPen tell you how to do so.  Prepare your family and friends  Wear a  medical ID brajasmin. It should let others know about your allergy and what to do in an emergency. Tell your family, friends, and coworkers what they should do if you have a severe allergic reaction:  · Tell them to call 911 if it seems you are having a reaction.  · Tell them to start CPR if you stop breathing.  · Ask them to make sure you are lying down with your legs raised during the reaction.  · Show them how to use the EpiPen.  · If they need to give you a shot, tell them to always use the side of your thigh.  What to do if you have an allergic reaction  If you have an allergic reaction, give yourself a shot using the EpiPen. This will counteract the reaction until medical help arrives.  · Use any site on the side of your thigh. There is no need to look for the best injection site or to give the shot in the buttocks or arm.  · With the tip of the EpiPen pointed toward the side of your thigh, jab the pen against your thigh for 10 seconds. This releases a spring-activated plunger, which pushes the hidden needle into the thigh muscle and gives a dose of adrenaline (epinephrine).  · Call 911 right after giving the injection.  · Lie down and elevate your legs while you wait for help to arrive.  Preventing allergic reactions  · Be careful. Try to avoid the items that cause your allergic reaction.  · Tell all your healthcare providers, including your pharmacist, about any allergies you have to medications. Keep a list of alternative medicines handy.  · Ask your healthcare provider if allergy shots (immunotherapy) will help you.  Follow-up care  Follow up with your healthcare provider, or as advised.  When to seek medical care   Call 911 right away if you have any of the following signs of severe allergic reaction:  · Racing pulse  · Wheezing or trouble breathing  · Swollen lips, tongue, or throat  · Drowsiness, fainting, or loss of consciousness  · Upset stomach (nausea) and vomiting  · Itchy, blotchy skin, rash, or  hives  · Pale, cool, damp skin  · Confusion   Date Last Reviewed: 7/1/2016  © 4115-6710 [employer]. [address] [place] [address], [place] [address]. All rights reserved. This information is not intended as a substitute for professional medical care. Always follow your healthcare professional's instructions.        Anaphylaxis  Anaphylaxis is a severe allergic reaction that can be life threatening. This reaction can happen in a few minutes, or a few hours after exposure to what you are allergic to. Some people are more prone to this than others.  The symptoms of an anaphylactic reaction may at first seem similar to other allergic reactions. If this has happened to you in the past, do not let the initial mild symptoms, such as a rash, hives and itching, mislead you. Your reaction can worsen very quickly and become much more severe and life threatening within minutes.  More severe symptoms include:  · Trouble swallowing, feeling like your throat is closing  · Trouble breathing, wheezing  · Cool, moist or pale (blue in color) skin  · Hoarse voice or trouble speaking  · Nausea, vomiting, diarrhea, stomach cramps, or pain  · Feeling faint or lightheaded, rapid heart rate, low blood pressure  · Feeling dizzy or confused  · Becoming very drowsy, poorly responsive, or trouble awakening  · Seizure  Sometimes the cause may be obvious, like knowing you are allergic to peanuts. To help identify your allergen, remember:  · When it started  · What you were doing at the time or just before that  · Any activities you were involved in  · Any new products or contacts   Here are some common causes, but remember almost anything can cause a reaction, and you may not even be aware that you came into contact with one of these things.  · Dust, mold, pollen  · Plants, such as poison ivy and poison oak  · Animals  · Foods such as shrimp, shellfish, peanuts, milk products, gluten, eggs; also colorings, flavorings, additives  · Insect  bites or stings such as bees, mosquitos, fleas, ticks  · Medicines such as penicillin, sulfa drugs, amoxicillin, aspirin, ibuprofen; any medicine can cause a reaction  · Jewelry such as nickel, or gold (new, or something youve worn for a while including zippers, and buttons)  · Latex such as in gloves, clothes, toys, balloons, or some tapes (some people allergic to latex may also  have problems with foods like bananas, avocados, kiwi, papaya, or chestnuts)  · Lotions, perfumes, cosmetics, soaps, shampoos, skincare products, nail products  · Chemicals or dyes in clothing, linen, , hair dyes, soaps, iodine  If you are exposed to the same substance again, you may have the same or more severe reaction. Treatment for anaphylaxis is epinephrine (adrenalin). This is available by prescription as a self-injectable pen. If the cause of your reaction is known, you should avoid exposure in the future. If the cause is not known, follow up with your doctor for special testing to determine what you are allergic to.     Home care  If it was safe for you to go home, watch for any worsening of symptoms. You may need to be treated again.  Medicines  Injectable epinephrine  One of the key tools in treating anaphylaxis is early use of epinephrine. If you had a severe allergic or anaphylactic reaction, the doctor may prescribe a self- injectable epinephrine kit. If this was prescribed, carry it at all times. It can be life saving. Epinephrine can help stop the progression of an allergic reaction. Its effects are brief, so after you use the medicine, it is still very important to call 911 and get to an emergency room.  When to use injectable epinephrine. Use the epinephrine if you have a history of severe reactions or any of the following symptoms:  · Swelling in your mouth or throat   · Trouble speaking or swallowing  · Trouble breathing  · Feeling faint, low blood pressure, or becoming drowsy or poorly responsive  · Worsening  rash  How to use injectable epinephrine:  · Hold the syringe firmly in your hand with the orange (or black) needle end away from your thumb  · Be careful not to stick your fingers or hand with the needle.  · At the opposite end, pull off the activation cap- the blue or grey tab  · Holding the syringe tightly, jab it into the outer part of your upper thigh. This is one of the softest, fleshiest parts of the upper leg, and is not near a major blood vessel or nerve. Be careful not to inject it into your hip or any place that there is a pulse.  · You can inject it through pants, but make sure not to inject it into the seam of the pants.  · Do not pull it out right away. Try to hold the needle in place for 10 seconds.  · Massage the spot for a few seconds or as directed by your healthcare provider.  · If you are injecting it in someone else or a child, try to hold them or their leg still. If they jerk or yank their legs away as you are doing it, it can cause a cut on their leg.  You may feel shaky, jittery, nervous, and anxious after the injection. Although it is difficult, try to relax. This is a side effect of the epinephrine, and should stop after a few minutes   Important  · Get to the emergency room after using the epinephrine. Its affect will wear off, and you may have a second reaction. This could even happen hours later.  · Never intentionally eat, use, or expose yourself to the substance that caused the anaphylactic reaction.  Nothing is foolproof, including the injectable epinephrine.  Other medicines  The doctor may prescribe medicine to relieve swelling, itching, and pain. Follow the doctors instructions when using this medicine.   · Oral diphenhydramine is an antihistamine available at drug and grocery stores. Unless a prescription antihistamine was given, diphenhydramine may be used to reduce itching if large areas of the skin are involved. It may make you sleepy, so be careful using it in the daytime or  when going to school, working, or driving.   · Do not use diphenhydramine cream on your skin, because in some people it can cause a further reaction.  · You may use over-the-counter acetaminophen or ibuprofen to control pain, unless another pain medicine was prescribed.  · If you were prescribed any medicines to prevent symptoms from returning, be sure to take them exactly as directed.  General care  · Rest at home for the next 24 hours.  · Avoid tobacco and alcohol consumption. These may worsen your symptoms.  · If you know what caused your reaction today, avoid that in the future since the next reaction may be worse. Let your family members, friends and personal physician know about your allergic reaction.  · If your allergy was to food, learn how to read food labels so you can check for that ingredient. If a product does not have a label, it is best to avoid it.  · Consider carrying an identification card or getting a medical alert bracelet to inform medical personnel of your condition in case you cannot tell them.  · Tell all of your healthcare providers that you had an anaphylactic reaction. Make certain the information is added to your electronic or paper medical records.  Follow-up care  Follow up with your doctor or as advised if you are not improving over the next 1 to 2 days.  Call 911  Call 911 if any of these occur:  · Trouble breathing or swallowing, wheezing  · Hoarse voice or trouble speaking  · Chest pain  · Confused  · Very drowsy or trouble awakening  · Fainting or loss of consciousness  · Rapid heart rate  · Vomiting blood, or large amounts of blood in stool  · Seizure  · Swelling in the eyes, mouth, face, or tongue  · Dizziness or weakness  · Cool, moist, or pale (blue in color) skin  · Nausea, vomiting, diarrhea, stomach cramps, or abdominal pain  When to seek medical advice  Call your healthcare provider right away if any of these occur:  · Worsening of your symptoms  · New symptoms  develop  · Symptoms don't go away or come back  Date Last Reviewed: 4/1/2017  © 5861-9620 [employer]. [address] [place] [address], [place] [address]. All rights reserved. This information is not intended as a substitute for professional medical care. Always follow your healthcare professional's instructions.        Food Allergy  The best way to deal with food allergies is to avoid the foods you are allergic to. Understand and be aware of the foods that you have reacted to. Also be cautious of foods or dishes that may have flavorings or small amounts of foods that you are allergic to.  Symptoms of food allergy may begin within minutes, but can start 2 hours after eating or later. Common symptoms can include:  · Nausea  · Vomiting  · Diarrhea or stomach cramps  · Iitchy rash (hives)  · Swelling of the eyes, lips, face or tongue  · Wheezing  · Difficulty breathing or swallowing  · Throat tightness  · Dizziness or fainting  This kind of allergic reaction, called anaphylaxis, can be life-threatening. In mild and moderate cases the symptoms usually begin improving within 6 to 24 hours. People with certain health problems, such as asthma and eczema, may be more likely to have food allergies. Foods that people are most commonly allergic to are milk or dairy products, eggs, peanuts, tree nuts, soy, shellfish, and wheat. Remember that any food can cause a reaction. Treatment for a severe allergic reaction can include epinephrine. If you have a severe food allergy, or have had severe allergic reactions even if you don't know the cause, you should carry this medicine with you for self-injection. It is available by prescription. It is also available in a lower dose form for children from your healthcare provider.  Home care  The following guidelines will help you care for yourself at home:  · If your symptoms were moderate to severe, they may fluctuate for the next 24 hours. It may be best to rest at home during that  "time.  · Avoid tobacco and alcohol because they can make symptoms worse. They can also interact with the medicines you are taking to treat the allergic reaction.  · If you know what foods caused your reaction today, avoid them in the future. The next and each reaction after this may make your body more sensitive to these foods. This can cause a worse reaction later. Tell your family members, friends, and doctors about your food allergy, especially in an emergency situation since they need to know how to give you epinephrine if you are unable to. This can be life-saving.  · Learn how to read food labels so you can check for the substance that you reacted to. If a food does not have a label, it is best to avoid it. When in restaurants, ask about ingredients and tell the staff, "If I eat a dish containing (food you are allergic to), I could have a severe allergic reaction."  · If your reaction was severe, get a medical alert bracelet or necklace that notes your allergy.  · If epinephrine is prescribed, carry it with you at all times. Learn how to use the device. If you begin to feel the symptoms of another reaction, use the epinephrine to inject yourself right away, and call 911. Dont wait until symptoms become severe.  · Oral allergy medicines (diphenhydramine) are antihistamines that can help with the reaction. You can buy them at any pharmacy or supermarket. They come in liquids, pills, or capsules. Unless your doctor gave you a prescription antihistamine, you can use these medicines to ease itching. Allergy medicines can make you sleepy, so be careful, especially when driving or working. For this reason, you may want to use lower doses during the day and save the higher doses for bedtime. Don't use diphenhydramine if you have glaucoma or if you are a man with trouble urinating because of an enlarged prostate.  · If allergy medicines with diphenhydramine make you too sleepy, talk with your healthcare provider. He " or she can recommend an over-the counter antihistamine that won't make you sleepy. These may not work as well, though.  Follow-up care  Follow up with your healthcare provider if your symptoms don't get better over the next 2 to 3 days. If you don't know what caused this reaction, your provider may order skin tests and blood tests, or an elimination diet. You can find an allergy specialist in your area by contacting:  · American Academy of Allergy, Asthma & Immunology, www.aaaai.org  · American College of Allergy, Asthma & Immunology, www.acaai.org  When to seek medical advice  Call your [facility] provider right away if any of these occur:  · Your symptoms get worse  · New or worse swelling in the face, eyelids, lips, mouth, throat, or tongue  · Mild trouble swallowing, breathing, or wheezing  · Fever of 100.4°F (38.0°C) or higher, or as directed by your health care provider  · Severe abdominal pain  · Persistent vomiting (unable to keep liquids down) or constant diarrhea  · Blood or mucus in the stool  Call 911  If any of these occur, give yourself injectable epinephrine and call 911:  · Significant trouble breathing, talking, or swallowing  · Any change in level of alertness or unconsciousness, including dizziness, weakness, or fainting  · Cool, moist skin  · Fast, weak hearbeat  · Severe wheezing  · Hives  · Severe swelling of the face, tongue, or lips  · Drooling  · Vomiting that happens soon after eating a food you think you are allergic to  · Explosive diarrhea  Date Last Reviewed: 1/11/2016  © 1479-6686 [employer]. [address] [place] [address]. All rights reserved. This information is not intended as a substitute for professional medical care. Always follow your healthcare professional's instructions.        Understanding Hives (Urticaria)  Urticaria (hives) are red, itchy, and swollen areas on the skin. They are most often an allergic reaction from eating a food or taking a  medicine. Sometimes the cause may be unknown. A single hive can vary in size from a half inch to several inches. Hives can appear all over the body. Or they may appear on only one part of the body.  What causes hives?  Hives can be caused by food and beverages such as:  · Tree nuts (almonds, walnuts, hazelnuts)  · Peanuts  · Eggs  · Shellfish  · Milk  Hives can also be caused by medicines such as:  · Antibiotics, especially penicillin and sulfa-based medicines   · Anticonvulsant or antiseizure medicines   · Chemotherapy medicines   Other causes of hives include:  · Infection or virus  · Heat  · Cold air or cold water  · Exercise  · Scratching or rubbing your skin, or wearing tight-fitting clothes that rub your skin  · Being exposed to sunlight or light from a light bulb, in rare cases  · Inhaled-chemicals in the environment from foods and drugs, insects, plants, or other sources  In some cases, hives may occur again and again with no specific cause.  If you have hives  · Avoid the food, drink, medicine, or other factor that may be causing the hives.  · Make a thick paste of baking soda and water. Apply the paste directly to your skin. This can help lessen itching.  · Talk with your healthcare provider right away if you think a medicine gave you hives.  Watch for anaphylaxis  If you have hives, watch for symptoms of a severe reaction that can affect your entire body. This is called anaphylaxis. Symptoms can include swollen areas of the body, wheezing, trouble breathing or swallowing, and a hoarse voice. This reaction may happen right away. Or it may happen in an hour or more. In extreme cases, the airways from mouth to lungs may swell and make breathing difficult. This is a medical emergency. Use epinephrine medicine if you have it, and call 911 or go to the emergency room.     When to call your healthcare provider  Call your healthcare provider if:  · Your hives feel uncomfortable  · You have never had hives  before  · Your symptoms don't go away or come back  · Your symptoms get worse or new symptoms develop such as:   ¨ Sneezing, coughing, runny or stuffy nose  ¨ Itching of the eyes, nose, or roof of the mouth  ¨ Itching, burning, stinging, or pain  ¨ Dry, flaky, cracking, or scaly skin  ¨ Red or purple spots  When to call 911  Call 911 right away if you have:  · Swelling in your lips, tongue, or throat, called angioedema  · Drooling  · Trouble breathing, talking, or swallowing  · Cool, moist or pale (blue in color) skin  · Fast and weak heartbeat  · Wheezing or short of breath  · Feeling lightheaded or confused  · Diarrhea  · Severe nausea or vomiting  · Seizure  · Feeling dizzy or weak, or a sudden drop in blood pressure   Date Last Reviewed: 4/1/2017  © 0987-9990 [employer]. [address] [place] [address], [place] [address]. All rights reserved. This information is not intended as a substitute for professional medical care. Always follow your healthcare professional's instructions.